# Patient Record
Sex: FEMALE | Race: WHITE | NOT HISPANIC OR LATINO
[De-identification: names, ages, dates, MRNs, and addresses within clinical notes are randomized per-mention and may not be internally consistent; named-entity substitution may affect disease eponyms.]

---
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
## 2022-06-27 ENCOUNTER — TELEPHONE ENCOUNTER (OUTPATIENT)
Dept: URBAN - METROPOLITAN AREA CLINIC 9 | Facility: CLINIC | Age: 22
End: 2022-06-27

## 2022-07-14 ENCOUNTER — OFFICE VISIT (OUTPATIENT)
Dept: URBAN - METROPOLITAN AREA CLINIC 9 | Facility: CLINIC | Age: 22
End: 2022-07-14

## 2022-07-30 ENCOUNTER — TELEPHONE ENCOUNTER (OUTPATIENT)
Age: 22
End: 2022-07-30

## 2022-07-30 RX ORDER — SODIUM SULFATE, POTASSIUM SULFATE, MAGNESIUM SULFATE 17.5; 3.13; 1.6 G/ML; G/ML; G/ML
1 (ONE) SOLUTION, CONCENTRATE ORAL
Qty: 0 | Refills: 2 | OUTPATIENT
Start: 2022-07-14 | End: 2022-07-15

## 2022-07-31 ENCOUNTER — TELEPHONE ENCOUNTER (OUTPATIENT)
Age: 22
End: 2022-07-31

## 2022-07-31 RX ORDER — PREDNISONE 10 MG/1
4 TABLET ORAL DAILY
Qty: 0 | Refills: 3 | Status: ACTIVE | COMMUNITY
Start: 2022-07-14

## 2022-07-31 RX ORDER — SODIUM SULFATE, POTASSIUM SULFATE, MAGNESIUM SULFATE 17.5; 3.13; 1.6 G/ML; G/ML; G/ML
1 (ONE) SOLUTION, CONCENTRATE ORAL
Qty: 0 | Refills: 2 | Status: ACTIVE | COMMUNITY
Start: 2022-07-14

## 2022-08-08 ENCOUNTER — OFFICE VISIT (OUTPATIENT)
Dept: URBAN - METROPOLITAN AREA SURGERY CENTER 9 | Facility: SURGERY CENTER | Age: 22
End: 2022-08-08
Payer: COMMERCIAL

## 2022-08-08 DIAGNOSIS — K56.699 COLON STRICTURE: ICD-10-CM

## 2022-08-08 DIAGNOSIS — K63.89 BACTERIAL OVERGROWTH SYNDROME: ICD-10-CM

## 2022-08-08 PROCEDURE — 45380 COLONOSCOPY AND BIOPSY: CPT | Performed by: INTERNAL MEDICINE

## 2022-08-08 RX ORDER — SODIUM SULFATE, POTASSIUM SULFATE, MAGNESIUM SULFATE 17.5; 3.13; 1.6 G/ML; G/ML; G/ML
1 (ONE) SOLUTION, CONCENTRATE ORAL
Qty: 0 | Refills: 2 | Status: ACTIVE | COMMUNITY
Start: 2022-07-14

## 2022-08-08 RX ORDER — PREDNISONE 10 MG/1
4 TABLET ORAL DAILY
Qty: 0 | Refills: 3 | Status: ACTIVE | COMMUNITY
Start: 2022-07-14

## 2022-08-23 ENCOUNTER — OFFICE VISIT (OUTPATIENT)
Dept: URBAN - METROPOLITAN AREA CLINIC 9 | Facility: CLINIC | Age: 22
End: 2022-08-23

## 2022-08-23 ENCOUNTER — TELEPHONE ENCOUNTER (OUTPATIENT)
Dept: URBAN - METROPOLITAN AREA CLINIC 9 | Facility: CLINIC | Age: 22
End: 2022-08-23

## 2022-08-23 RX ORDER — PREDNISONE 10 MG/1
4 TABLET ORAL DAILY
Qty: 0 | Refills: 3 | Status: ACTIVE | COMMUNITY
Start: 2022-07-14

## 2022-08-23 RX ORDER — SODIUM SULFATE, POTASSIUM SULFATE, MAGNESIUM SULFATE 17.5; 3.13; 1.6 G/ML; G/ML; G/ML
1 (ONE) SOLUTION, CONCENTRATE ORAL
Qty: 0 | Refills: 2 | Status: ACTIVE | COMMUNITY
Start: 2022-07-14

## 2022-08-23 NOTE — HPI-TODAY'S VISIT:
21-year-old female here for follow-up.  We last saw her about a month ago. At prior visit: 21-year-old female here for hospital follow-up.  Patient has a history of Crohn's disease.  She said she was diagnosed in 2012 when her main symptom was the perirectal fistula constipation and abdominal pain.  It sounds like she had a fistula repair and then but was put on Remicade.  She had 4-5 doses and had some kind of reaction so was switch to Humira.  This initially worked but then she got antibodies and then stopped working so she was on Entyvio.  This worked for a little bit and then stopped working so she was put on Stelara about 2-3 years ago.  At some point she was switch to every 6 weeks I do not know when.  She said in May she started having reoccurrence of right lower quadrant pain.  It was pretty bad so she went to the ER recent labs show white blood cell count of 10 and a hemoglobin of 14.3.  Should a CT scan that showed acute uncomplicated appendicitis.  She had wall thickening of the cecum and the ascending colon which was likely reactive.  She also had a dilated appendix.  ESR was 72.  She was at home on prednisone 40 mg and Bentyl.  She said the pain is gotten better now that she is on the prednisone.  She is about 2-3 bowel movements a day.  There may be slightly looser than normal.  We have a fecal Hang protectant on June 30 is which was 985.  This was pre-prednisone.  Also Stelara lab was 4.5 at the trough. At last visit we obtain records.: We have records which show on November 14 fecal Hang protectant was 4 8 we also have an MR enterography on 2014 which showed irregular wall thickening and enhancement in the terminal ileum in IC valve.  This is similar to the previous visit.  And loss of normal mucosal folds in the distal ileum.  There is also wall thickening in the proximal jejunum.  In 2017 I have another MRA which showed internal worsening mucosal enhancement of the terminal ileum and cecum in 2018 again worsening inflammatory changes in the ascending colon and the cecum and the terminal ileum  She has a history of ileocolonic Crohn's.  She was diagnosed at age 12.  Poorly had only small bowel involvement at that time.  She was immediately started on Remicade had a reaction after the third 1, not sure what this was so switch to Humira.  Remained on Humira for quite some time apparently she had some psoriasis but this was treated topically but then developed antibodies good she was not taking it consistently because it was painful due to lack of citrate because she is antibodies to switch to Entyvio which she been on for 2 years initially was every 8 weeks but she had persistent symptoms so switch to every 4 weeks knows every 6 weeks.  She was having intermittent right lower quadrant pain on this.  Her last endoscopy of 2018 showed colitis in the cecum with pseudopolyps and distortion of the cecal anatomy and inability to identify the ileocecal valve were in a paper the ileum.  The ascending pathology showed chronic colitis the remaining biopsies in the left side of colon and rectum were normal.  In generative 2020 she had another colonoscopy which again revealed inflammation the cecum inflamed ileum that was difficult to intubate so she was started on Entyvio March 26 of 2020.  Ileum showed active chronic ileitis with ulceration she also the cecal abnormality with polypoid large intestinal mucosa but that just came back as reactive with chronic inflammation After being on Stelara for about 4 weeks she went to the ER and she had a bowel obstruction she was started on tacrolimus and continued Stelara  Her small bowel obstruction on Stelara given she only been on 4 weeks she was put on high-dose steroids and was side to reinduce her was Stelara and tacrolimus was used as bridging therapy.  She was then put on Stelara every 4 weeks I think just due to the whole clinical picture  Her clinical picture was constipated in generative 2021 when she developed severe papilledema in vision abnormalities.  And headaches varied she was treated with Diamox  We proceeded with colonoscopy which was normal other than she had a cecal abnormality which was deformed and unable to enter into the ileum.  She had a cecal lesion which has been there for years that just came back as inflammation.  Colonic biopsies were normal but cecal biopsy showed inflammation.  CT 3 showed improved inflammation in the terminal ileum although there was still some.  We did ask her to check Remicade level antibodies to see if she had that as she is interested in going back to that

## 2022-08-25 ENCOUNTER — TELEPHONE ENCOUNTER (OUTPATIENT)
Dept: URBAN - METROPOLITAN AREA CLINIC 9 | Facility: CLINIC | Age: 22
End: 2022-08-25

## 2022-08-26 ENCOUNTER — TELEPHONE ENCOUNTER (OUTPATIENT)
Dept: URBAN - METROPOLITAN AREA CLINIC 9 | Facility: CLINIC | Age: 22
End: 2022-08-26

## 2022-08-29 ENCOUNTER — TELEPHONE ENCOUNTER (OUTPATIENT)
Dept: URBAN - METROPOLITAN AREA CLINIC 9 | Facility: CLINIC | Age: 22
End: 2022-08-29

## 2022-08-29 RX ORDER — USTEKINUMAB 90 MG/ML
AS DIRECTED INJECTION, SOLUTION SUBCUTANEOUS
Qty: 4 | Refills: 4

## 2022-08-29 RX ORDER — USTEKINUMAB 90 MG/ML
AS DIRECTED INJECTION, SOLUTION SUBCUTANEOUS
Qty: 4 | Refills: 4
End: 2023-06-05

## 2022-08-30 ENCOUNTER — TELEPHONE ENCOUNTER (OUTPATIENT)
Dept: URBAN - METROPOLITAN AREA CLINIC 9 | Facility: CLINIC | Age: 22
End: 2022-08-30

## 2022-09-08 ENCOUNTER — OFFICE VISIT (OUTPATIENT)
Dept: URBAN - METROPOLITAN AREA CLINIC 9 | Facility: CLINIC | Age: 22
End: 2022-09-08

## 2022-09-08 NOTE — HPI-TODAY'S VISIT:
21-year-old female here for follow-up.  We last saw her about a month ago. At prior visit: 21-year-old female here for hospital follow-up.  Patient has a history of Crohn's disease.  She said she was diagnosed in 2012 when her main symptom was the perirectal fistula constipation and abdominal pain.  It sounds like she had a fistula repair and then but was put on Remicade.  She had 4-5 doses and had some kind of reaction so was switch to Humira.  This initially worked but then she got antibodies and then stopped working so she was on Entyvio.  This worked for a little bit and then stopped working so she was put on Stelara about 2-3 years ago.  At some point she was switch to every 6 weeks I do not know when.  She said in May she started having reoccurrence of right lower quadrant pain.  It was pretty bad so she went to the ER recent labs show white blood cell count of 10 and a hemoglobin of 14.3.  Should a CT scan that showed acute uncomplicated appendicitis.  She had wall thickening of the cecum and the ascending colon which was likely reactive.  She also had a dilated appendix.  ESR was 72.  She was at home on prednisone 40 mg and Bentyl.  She said the pain is gotten better now that she is on the prednisone.  She is about 2-3 bowel movements a day.  There may be slightly looser than normal.  We have a fecal Hang protectant on June 30 is which was 985.  This was pre-prednisone.  Also Stelara lab was 4.5 at the trough. At last visit we obtain records.: We have records which show on November 14 fecal Hang protectant was 4 8 we also have an MR enterography on 2014 which showed irregular wall thickening and enhancement in the terminal ileum in IC valve.  This is similar to the previous visit.  And loss of normal mucosal folds in the distal ileum.  There is also wall thickening in the proximal jejunum.  In 2017 I have another MRA which showed internal worsening mucosal enhancement of the terminal ileum and cecum in 2018 again worsening inflammatory changes in the ascending colon and the cecum and the terminal ileum  She has a history of ileocolonic Crohn's.  She was diagnosed at age 12.  Poorly had only small bowel involvement at that time.  She was immediately started on Remicade had a reaction after the third 1, not sure what this was so switch to Humira.  Remained on Humira for quite some time apparently she had some psoriasis but this was treated topically but then developed antibodies good she was not taking it consistently because it was painful due to lack of citrate because she is antibodies to switch to Entyvio which she been on for 2 years initially was every 8 weeks but she had persistent symptoms so switch to every 4 weeks knows every 6 weeks.  She was having intermittent right lower quadrant pain on this.  Her last endoscopy of 2018 showed colitis in the cecum with pseudopolyps and distortion of the cecal anatomy and inability to identify the ileocecal valve were in a paper the ileum.  The ascending pathology showed chronic colitis the remaining biopsies in the left side of colon and rectum were normal.  In generative 2020 she had another colonoscopy which again revealed inflammation the cecum inflamed ileum that was difficult to intubate so she was started on Entyvio March 26 of 2020.  Ileum showed active chronic ileitis with ulceration she also the cecal abnormality with polypoid large intestinal mucosa but that just came back as reactive with chronic inflammation After being on Stelara for about 4 weeks she went to the ER and she had a bowel obstruction she was started on tacrolimus and continued Stelara  Her small bowel obstruction on Stelara given she only been on 4 weeks she was put on high-dose steroids and was side to reinduce her was Stelara and tacrolimus was used as bridging therapy.  She was then put on Stelara every 4 weeks I think just due to the whole clinical picture  Her clinical picture was constipated in generative 2021 when she developed severe papilledema in vision abnormalities.  And headaches varied she was treated with Diamox  We proceeded with colonoscopy which was normal other than she had a cecal abnormality which was deformed and unable to enter into the ileum.  She had a cecal lesion which has been there for years that just came back as inflammation.  Colonic biopsies were normal but cecal biopsy showed inflammation.  CT 3 showed improved inflammation in the terminal ileum although there was still some.  We did ask her to check Remicade level antibodies to see if she had that as she is interested in going back to that At last visit, given active ileal dz despite being on q 6 weeks of stelara for over one year, we decided we were going to switch her to either remicaide or skyrizi, we were awaiting to see if she has remicaide antibodies

## 2022-09-12 ENCOUNTER — TELEPHONE ENCOUNTER (OUTPATIENT)
Dept: URBAN - METROPOLITAN AREA CLINIC 9 | Facility: CLINIC | Age: 22
End: 2022-09-12

## 2022-09-20 ENCOUNTER — WEB ENCOUNTER (OUTPATIENT)
Dept: URBAN - METROPOLITAN AREA CLINIC 9 | Facility: CLINIC | Age: 22
End: 2022-09-20

## 2022-09-20 ENCOUNTER — LAB OUTSIDE AN ENCOUNTER (OUTPATIENT)
Dept: URBAN - METROPOLITAN AREA CLINIC 9 | Facility: CLINIC | Age: 22
End: 2022-09-20

## 2022-09-20 ENCOUNTER — OFFICE VISIT (OUTPATIENT)
Dept: URBAN - METROPOLITAN AREA CLINIC 9 | Facility: CLINIC | Age: 22
End: 2022-09-20
Payer: COMMERCIAL

## 2022-09-20 ENCOUNTER — TELEPHONE ENCOUNTER (OUTPATIENT)
Dept: URBAN - METROPOLITAN AREA CLINIC 7 | Facility: CLINIC | Age: 22
End: 2022-09-20

## 2022-09-20 VITALS
HEIGHT: 63 IN | SYSTOLIC BLOOD PRESSURE: 127 MMHG | WEIGHT: 194 LBS | DIASTOLIC BLOOD PRESSURE: 88 MMHG | BODY MASS INDEX: 34.38 KG/M2

## 2022-09-20 DIAGNOSIS — K50.90 CROHN DISEASE: ICD-10-CM

## 2022-09-20 PROCEDURE — 99214 OFFICE O/P EST MOD 30 MIN: CPT | Performed by: INTERNAL MEDICINE

## 2022-09-20 RX ORDER — USTEKINUMAB 90 MG/ML
AS DIRECTED INJECTION, SOLUTION SUBCUTANEOUS
Qty: 4 | Refills: 4 | Status: ACTIVE | COMMUNITY
End: 2023-06-05

## 2022-09-20 RX ORDER — PREDNISONE 10 MG/1
4 TABLET ORAL DAILY
Qty: 0 | Refills: 3 | Status: DISCONTINUED | COMMUNITY
Start: 2022-07-14

## 2022-09-20 RX ORDER — SODIUM SULFATE, POTASSIUM SULFATE, MAGNESIUM SULFATE 17.5; 3.13; 1.6 G/ML; G/ML; G/ML
1 (ONE) SOLUTION, CONCENTRATE ORAL
Qty: 0 | Refills: 2 | Status: DISCONTINUED | COMMUNITY
Start: 2022-07-14

## 2022-09-20 RX ORDER — RISANKIZUMAB-RZAA 60 MG/ML
AS DIRECTED INJECTION INTRAVENOUS AS DIRECTED
Qty: 3 | Refills: 0 | OUTPATIENT
Start: 2022-09-20 | End: 2022-11-18

## 2022-09-20 NOTE — HPI-TODAY'S VISIT:
21-year-old female here for follow-up.  We last saw her about a month ago. At prior visit: 21-year-old female here for hospital follow-up.  Patient has a history of Crohn's disease.  She said she was diagnosed in 2012 when her main symptom was the perirectal fistula constipation and abdominal pain.  It sounds like she had a fistula repair and then but was put on Remicade.  She had 4-5 doses and had some kind of reaction so was switch to Humira.  This initially worked but then she got antibodies and then stopped working so she was on Entyvio.  This worked for a little bit and then stopped working so she was put on Stelara about 2-3 years ago.  At some point she was switched to every 6 weeks I do not know when.  She said in May she started having reoccurrence of right lower quadrant pain.  It was pretty bad so she went to the ER recent labs show white blood cell count of 10 and a hemoglobin of 14.3.  Should a CT scan that showed acute uncomplicated appendicitis.  She had wall thickening of the cecum and the ascending colon which was likely reactive.  She also had a dilated appendix.  ESR was 72.  She was at home on prednisone 40 mg and Bentyl.  She said the pain is gotten better now that she is on the prednisone.  She is about 2-3 bowel movements a day.  There may be slightly looser than normal.  We have a fecal Hang protectant on June 30 is which was 985.  This was pre-prednisone.  Also Stelara lab was 4.5 at the trough. At last visit we obtain records.: We have records which show on November 14 fecal Hang protectant was  8 we also have an MR enterography on 2014 which showed irregular wall thickening and enhancement in the terminal ileum in IC valve.  This is similar to the previous visit.  And loss of normal mucosal folds in the distal ileum.  There is also wall thickening in the proximal jejunum.  In 2017 I have another MRE which showed internal worsening mucosal enhancement of the terminal ileum and cecum in 2018 again worsening inflammatory changes in the ascending colon and the cecum and the terminal ileum  She has a history of ileocolonic Crohn's.  She was diagnosed at age 12.  Initially  had only small bowel involvement at that time.  She was immediately started on Remicade had a reaction after the third 1, and switched to Humira.  Remained on Humira for quite some time apparently she had some psoriasis but this was treated topically but then developed antibodies so  she was  switched to Entyvio which she been on for 2 years initially was every 8 weeks but she had persistent symptoms so switch to every 4 weeks knows every 6 weeks.  She was having intermittent right lower quadrant pain on this.  Her last endoscopy of 2018 showed colitis in the cecum with pseudopolyps and distortion of the cecal anatomy and inability to identify the ileocecal valve were in a paper the ileum.  The ascending pathology showed chronic colitis the remaining biopsies in the left side of colon and rectum were normal.  In  2020 she had another colonoscopy which again revealed inflammation the cecum inflamed ileum that was difficult to intubate so she was started on Entyvio March 26 of 2020.  Ileum showed active chronic ileitis with ulceration she also the cecal abnormality with polypoid large intestinal mucosa but that just came back as reactive with chronic inflammation After being on Stelara for about 4 weeks she went to the ER and she had a bowel obstruction she was started on tacrolimus and continued Stelara  Given her small bowel obstruction on Stelara given she only been on 4 weeks she was put on high-dose steroids and was decided to reinduce her was Stelara and tacrolimus was used as bridging therapy.  She was then put on Stelara every 6 weeks I think just due to the whole clinical picture  In 2021, developed severe papilledema and  vision abnormalities.  And headaches varied she was treated with Diamox  We proceeded with colonoscopy this year 7/2022 which was normal other than she had a cecal abnormality which was deformed and unable to enter into the ileum.  She had a cecal lesion which has been there for years that just came back as inflammation.  Colonic biopsies were normal but cecal biopsy showed inflammation.  CT 3 showed improved inflammation in the terminal ileum although there was still some inflammation.   We did ask her to check Remicade level antibodies to see if she had that as she is interested in going back to that At last visit, given active ileal dz despite being on q 6 weeks of stelara for over one year, we decided we were going to switch her to either remicaide or skyrizi, we were awaiting to see if she has remicaide antibodies.  There was no antibodies.    She is here for follow-up today.  She had been put on a trial of steroids in July.  This was before she had that CT scan.  She is having really intermittent right lower quadrant pain not related to eating or bowel movement.  She has about 2 bowel movements a day that are loose that can fluctuate with constipation.  When her symptoms were very bad she had more constipation.  Weight is stable.  She says when she tried Remicade she had chest pain.

## 2022-10-29 LAB
HEP B CORE AB, TOT: (no result)
HEPATITIS B SURFACE ANTIBODY QL: (no result)
MITOGEN-NIL: >10
NIL: 0.04
QUANTIFERON(R)-TB GOLD: NEGATIVE
TB1-NIL: 0
TB2-NIL: 0

## 2022-11-03 ENCOUNTER — TELEPHONE ENCOUNTER (OUTPATIENT)
Dept: URBAN - METROPOLITAN AREA CLINIC 7 | Facility: CLINIC | Age: 22
End: 2022-11-03

## 2022-12-09 LAB — HEPATITIS B SURFACE ANTIGEN: (no result)

## 2022-12-10 ENCOUNTER — TELEPHONE ENCOUNTER (OUTPATIENT)
Dept: URBAN - METROPOLITAN AREA CLINIC 9 | Facility: CLINIC | Age: 22
End: 2022-12-10

## 2022-12-10 RX ORDER — USTEKINUMAB 90 MG/ML
AS DIRECTED INJECTION, SOLUTION SUBCUTANEOUS
Qty: 4 | Refills: 4 | COMMUNITY
End: 2023-06-05

## 2022-12-10 RX ORDER — RISANKIZUMAB-RZAA 60 MG/ML
AS DIRECTED INJECTION INTRAVENOUS
Qty: 1 APPLICATOR | Refills: 0 | OUTPATIENT

## 2022-12-10 RX ORDER — RISANKIZUMAB-RZAA 360 MG/2.4
AS DIRECTED KIT SUBCUTANEOUS
Qty: 4 APPLICATOR | Refills: 0 | OUTPATIENT
Start: 2022-12-10 | End: 2022-12-18

## 2023-01-19 ENCOUNTER — TELEPHONE ENCOUNTER (OUTPATIENT)
Dept: URBAN - METROPOLITAN AREA CLINIC 7 | Facility: CLINIC | Age: 23
End: 2023-01-19

## 2023-01-19 RX ORDER — RISANKIZUMAB-RZAA 60 MG/ML
AS DIRECTED INJECTION INTRAVENOUS
Qty: 1 APPLICATOR | Refills: 0 | COMMUNITY

## 2023-01-19 RX ORDER — USTEKINUMAB 90 MG/ML
AS DIRECTED INJECTION, SOLUTION SUBCUTANEOUS
Qty: 4 | Refills: 4 | COMMUNITY
End: 2023-06-05

## 2023-01-19 RX ORDER — RISANKIZUMAB-RZAA 360 MG/2.4
AS DIRECTED WEARABLE INJECTOR SUBCUTANEOUS
Qty: 4 | Refills: 0 | OUTPATIENT
Start: 2023-01-19 | End: 2023-01-27

## 2023-01-30 ENCOUNTER — LAB OUTSIDE AN ENCOUNTER (OUTPATIENT)
Dept: URBAN - METROPOLITAN AREA CLINIC 9 | Facility: CLINIC | Age: 23
End: 2023-01-30

## 2023-01-30 ENCOUNTER — TELEPHONE ENCOUNTER (OUTPATIENT)
Dept: URBAN - METROPOLITAN AREA CLINIC 9 | Facility: CLINIC | Age: 23
End: 2023-01-30

## 2023-02-28 ENCOUNTER — OFFICE VISIT (OUTPATIENT)
Dept: URBAN - METROPOLITAN AREA CLINIC 9 | Facility: CLINIC | Age: 23
End: 2023-02-28
Payer: COMMERCIAL

## 2023-02-28 ENCOUNTER — LAB OUTSIDE AN ENCOUNTER (OUTPATIENT)
Dept: URBAN - METROPOLITAN AREA CLINIC 9 | Facility: CLINIC | Age: 23
End: 2023-02-28

## 2023-02-28 ENCOUNTER — WEB ENCOUNTER (OUTPATIENT)
Dept: URBAN - METROPOLITAN AREA CLINIC 9 | Facility: CLINIC | Age: 23
End: 2023-02-28

## 2023-02-28 VITALS
DIASTOLIC BLOOD PRESSURE: 83 MMHG | HEIGHT: 63 IN | WEIGHT: 180 LBS | BODY MASS INDEX: 31.89 KG/M2 | SYSTOLIC BLOOD PRESSURE: 124 MMHG

## 2023-02-28 DIAGNOSIS — K82.4 GALLBLADDER POLYP: ICD-10-CM

## 2023-02-28 DIAGNOSIS — K21.9 REFLUX: ICD-10-CM

## 2023-02-28 DIAGNOSIS — K50.90 CROHN DISEASE: ICD-10-CM

## 2023-02-28 PROCEDURE — 99204 OFFICE O/P NEW MOD 45 MIN: CPT | Performed by: INTERNAL MEDICINE

## 2023-02-28 PROCEDURE — 99214 OFFICE O/P EST MOD 30 MIN: CPT | Performed by: INTERNAL MEDICINE

## 2023-02-28 RX ORDER — ONDANSETRON 8 MG/1
1 TABLET ON THE TONGUE AND ALLOW TO DISSOLVE  AS NEEDED TABLET, ORALLY DISINTEGRATING ORAL ONCE A DAY
Qty: 90 | Refills: 0 | OUTPATIENT
Start: 2023-02-28

## 2023-02-28 RX ORDER — PANTOPRAZOLE SODIUM 40 MG/1
1 TABLET TABLET, DELAYED RELEASE ORAL ONCE A DAY
Qty: 30 | OUTPATIENT
Start: 2023-02-28

## 2023-02-28 RX ORDER — USTEKINUMAB 90 MG/ML
AS DIRECTED INJECTION, SOLUTION SUBCUTANEOUS
Qty: 4 | Refills: 4 | Status: DISCONTINUED | COMMUNITY
End: 2023-06-05

## 2023-02-28 RX ORDER — RISANKIZUMAB-RZAA 60 MG/ML
AS DIRECTED INJECTION INTRAVENOUS
Qty: 1 APPLICATOR | Refills: 0 | Status: ACTIVE | COMMUNITY

## 2023-02-28 NOTE — HPI-TODAY'S VISIT:
21-year-old female here for follow-up.  We last saw her about a month ago. At prior visit: 21-year-old female here for hospital follow-up.  Patient has a history of Crohn's disease.  She said she was diagnosed in 2012 when her main symptom was the perirectal fistula constipation and abdominal pain.  It sounds like she had a fistula repair and then but was put on Remicade.  She had 4-5 doses and had some kind of reaction so was switch to Humira.  This initially worked but then she got antibodies and then stopped working so she was on Entyvio.  This worked for a little bit and then stopped working so she was put on Stelara about 2-3 years ago.  At some point she was switched to every 6 weeks I do not know when.  She said in May she started having reoccurrence of right lower quadrant pain.  It was pretty bad so she went to the ER recent labs show white blood cell count of 10 and a hemoglobin of 14.3.  Should a CT scan that showed acute uncomplicated appendicitis.  She had wall thickening of the cecum and the ascending colon which was likely reactive.  She also had a dilated appendix.  ESR was 72.  She was at home on prednisone 40 mg and Bentyl.  She said the pain is gotten better now that she is on the prednisone.  She is about 2-3 bowel movements a day.  There may be slightly looser than normal.  We have a fecal Hang protectant on June 30 is which was 985.  This was pre-prednisone.  Also Stelara lab was 4.5 at the trough. At last visit we obtain records.: We have records which show on November 14 fecal Hang protectant was  8 we also have an MR enterography on 2014 which showed irregular wall thickening and enhancement in the terminal ileum in IC valve.  This is similar to the previous visit.  And loss of normal mucosal folds in the distal ileum.  There is also wall thickening in the proximal jejunum.  In 2017 I have another MRE which showed internal worsening mucosal enhancement of the terminal ileum and cecum in 2018 again worsening inflammatory changes in the ascending colon and the cecum and the terminal ileum  She has a history of ileocolonic Crohn's.  She was diagnosed at age 12.  Initially  had only small bowel involvement at that time.  She was immediately started on Remicade had a reaction after the third 1, and switched to Humira.  Remained on Humira for quite some time apparently she had some psoriasis but this was treated topically but then developed antibodies so  she was  switched to Entyvio which she been on for 2 years initially was every 8 weeks but she had persistent symptoms so switch to every 4 weeks knows every 6 weeks.  She was having intermittent right lower quadrant pain on this.  Her last endoscopy of 2018 showed colitis in the cecum with pseudopolyps and distortion of the cecal anatomy and inability to identify the ileocecal valve were in a paper the ileum.  The ascending pathology showed chronic colitis the remaining biopsies in the left side of colon and rectum were normal.  In  2020 she had another colonoscopy which again revealed inflammation the cecum inflamed ileum that was difficult to intubate so she was started on Entyvio March 26 of 2020.  Ileum showed active chronic ileitis with ulceration she also the cecal abnormality with polypoid large intestinal mucosa but that just came back as reactive with chronic inflammation After being on Stelara for about 4 weeks she went to the ER and she had a bowel obstruction she was started on tacrolimus and continued Stelara  Given her small bowel obstruction on Stelara given she only been on 4 weeks she was put on high-dose steroids and was decided to reinduce her was Stelara and tacrolimus was used as bridging therapy.  She was then put on Stelara every 6 weeks I think just due to the whole clinical picture  In 2021, developed severe papilledema and  vision abnormalities.  And headaches varied she was treated with Diamox  We proceeded with colonoscopy this year 7/2022 which was normal other than she had a cecal abnormality which was deformed and unable to enter into the ileum.  She had a cecal lesion which has been there for years that just came back as inflammation.  Colonic biopsies were normal but cecal biopsy showed inflammation.  CT 3 showed improved inflammation in the terminal ileum although there was still some inflammation.   We did ask her to check Remicade level antibodies to see if she had that as she is interested in going back to that At last visit, given active ileal dz despite being on q 6 weeks of stelara for over one year, we decided we were going to switch her to either remicaide or skyrizi, we were awaiting to see if she has remicaide antibodies.  There was no antibodies.    She is here for follow-up today.  She had been put on a trial of steroids in July.  This was before she had that CT scan.  She is having really intermittent right lower quadrant pain not related to eating or bowel movement.  She has about 2 bowel movements a day that are loose that can fluctuate with constipation.  When her symptoms were very bad she had more constipation.  Weight is stable.  She says when she tried Remicade she had chest pain.  At last visit we received a do a CT enterography and fecal calprotectin and started paperwork for Skyrizi Do not see fecal calprotectin in.  CT [place] fee in July showed decrease inflammation in the ileum but there is still mild inflammation and we switched her to Skyrizi which did take a long time due to authorization issues  labs in August were normal, she is not immune to hepatitis B  She just started her Skyrizi and had 2 doses her last one was February 8.  She has not really noticed a change.  Then couple days ago she had acute onset of right lower quadrant pain which is normally where she gets her pain but this was more significant.  She went to the ER and they did basic labs which showed normal hemoglobin but an elevated white count of 16.5 normal LFTs.  For some reason they did an ultrasound instead of a CT scan which showed a 2 mm gallbladder polyp.  They did not image her.  She is also having nausea and fever and.  And also having some numbness in her hands.  This is really resolved and she is back to having just intermittent right lower quadrant abdominal pain.  Maybe a little bit more frequently than other times.  They also sent her home with dicyclomine.  She is having 1 bowel movement a day which is somewhat loose.  No hematochezia she has lost a few pounds and she is having a lot of reflux which is a newer symptom.  She still having a little bit of nausea and has not really tried to advance her diet since being out of the ER

## 2023-03-01 ENCOUNTER — TELEPHONE ENCOUNTER (OUTPATIENT)
Dept: URBAN - METROPOLITAN AREA CLINIC 9 | Facility: CLINIC | Age: 23
End: 2023-03-01

## 2023-03-01 PROBLEM — 235595009 GASTROESOPHAGEAL REFLUX DISEASE: Status: ACTIVE | Noted: 2023-02-28

## 2023-03-01 PROBLEM — 34000006 CROHN DISEASE: Status: ACTIVE | Noted: 2022-08-05

## 2023-03-07 ENCOUNTER — OFFICE VISIT (OUTPATIENT)
Dept: URBAN - METROPOLITAN AREA CLINIC 9 | Facility: CLINIC | Age: 23
End: 2023-03-07
Payer: COMMERCIAL

## 2023-03-07 VITALS
DIASTOLIC BLOOD PRESSURE: 78 MMHG | WEIGHT: 173 LBS | BODY MASS INDEX: 30.65 KG/M2 | SYSTOLIC BLOOD PRESSURE: 118 MMHG | HEIGHT: 63 IN

## 2023-03-07 DIAGNOSIS — K50.90 CROHN'S DISEASE WITHOUT COMPLICATION, UNSPECIFIED GASTROINTESTINAL TRACT LOCATION: ICD-10-CM

## 2023-03-07 DIAGNOSIS — K82.4 GALLBLADDER POLYP: ICD-10-CM

## 2023-03-07 DIAGNOSIS — K21.9 GERD: ICD-10-CM

## 2023-03-07 PROCEDURE — 99214 OFFICE O/P EST MOD 30 MIN: CPT | Performed by: INTERNAL MEDICINE

## 2023-03-07 RX ORDER — ONDANSETRON 8 MG/1
1 TABLET ON THE TONGUE AND ALLOW TO DISSOLVE  AS NEEDED TABLET, ORALLY DISINTEGRATING ORAL ONCE A DAY
Qty: 90 | Refills: 0 | Status: ACTIVE | COMMUNITY
Start: 2023-02-28

## 2023-03-07 RX ORDER — PANTOPRAZOLE SODIUM 40 MG/1
1 TABLET TABLET, DELAYED RELEASE ORAL ONCE A DAY
Qty: 30 | Status: ACTIVE | COMMUNITY
Start: 2023-02-28

## 2023-03-07 RX ORDER — RISANKIZUMAB-RZAA 60 MG/ML
AS DIRECTED INJECTION INTRAVENOUS
Qty: 1 APPLICATOR | Refills: 0 | Status: ACTIVE | COMMUNITY

## 2023-03-07 NOTE — PHYSICAL EXAM NECK/THYROID:
normal appearance , without tenderness upon palpation , no deformities , trachea midline , Thyroid normal size , no masses , thyroid nontender 14-Jun-2017 14:05

## 2023-03-07 NOTE — HPI-TODAY'S VISIT:
21-year-old female here for follow-up.  We last saw her about a month ago. At prior visit: 21-year-old female here for hospital follow-up.  Patient has a history of Crohn's disease.  She said she was diagnosed in 2012 when her main symptom was the perirectal fistula constipation and abdominal pain.  It sounds like she had a fistula repair and then but was put on Remicade.  She had 4-5 doses and had some kind of reaction so was switch to Humira.  This initially worked but then she got antibodies and then stopped working so she was on Entyvio.  This worked for a little bit and then stopped working so she was put on Stelara about 2-3 years ago.  At some point she was switched to every 6 weeks I do not know when.  She said in May she started having reoccurrence of right lower quadrant pain.  It was pretty bad so she went to the ER recent labs show white blood cell count of 10 and a hemoglobin of 14.3.  Should a CT scan that showed acute uncomplicated appendicitis.  She had wall thickening of the cecum and the ascending colon which was likely reactive.  She also had a dilated appendix.  ESR was 72.  She was at home on prednisone 40 mg and Bentyl.  She said the pain is gotten better now that she is on the prednisone.  She is about 2-3 bowel movements a day.  There may be slightly looser than normal.  We have a fecal Hang protectant on June 30 is which was 985.  This was pre-prednisone.  Also Stelara lab was 4.5 at the trough. At last visit we obtain records.: We have records which show on November 14 fecal Hang protectant was  8 we also have an MR enterography on 2014 which showed irregular wall thickening and enhancement in the terminal ileum in IC valve.  This is similar to the previous visit.  And loss of normal mucosal folds in the distal ileum.  There is also wall thickening in the proximal jejunum.  In 2017 I have another MRE which showed internal worsening mucosal enhancement of the terminal ileum and cecum in 2018 again worsening inflammatory changes in the ascending colon and the cecum and the terminal ileum  She has a history of ileocolonic Crohn's.  She was diagnosed at age 12.  Initially  had only small bowel involvement at that time.  She was immediately started on Remicade had a reaction after the third 1, and switched to Humira.  Remained on Humira for quite some time apparently she had some psoriasis but this was treated topically but then developed antibodies so  she was  switched to Entyvio which she been on for 2 years initially was every 8 weeks but she had persistent symptoms so switch to every 4 weeks knows every 6 weeks.  She was having intermittent right lower quadrant pain on this.  Her last endoscopy of 2018 showed colitis in the cecum with pseudopolyps and distortion of the cecal anatomy and inability to identify the ileocecal valve were in a paper the ileum.  The ascending pathology showed chronic colitis the remaining biopsies in the left side of colon and rectum were normal.  In  2020 she had another colonoscopy which again revealed inflammation the cecum inflamed ileum that was difficult to intubate so she was started on Entyvio March 26 of 2020.  Ileum showed active chronic ileitis with ulceration she also the cecal abnormality with polypoid large intestinal mucosa but that just came back as reactive with chronic inflammation After being on Stelara for about 4 weeks she went to the ER and she had a bowel obstruction she was started on tacrolimus and continued Stelara  Given her small bowel obstruction on Stelara given she only been on 4 weeks she was put on high-dose steroids and was decided to reinduce her was Stelara and tacrolimus was used as bridging therapy.  She was then put on Stelara every 6 weeks I think just due to the whole clinical picture  In 2021, developed severe papilledema and  vision abnormalities.  And headaches varied she was treated with Diamox  We proceeded with colonoscopy this year 7/2022 which was normal other than she had a cecal abnormality which was deformed and unable to enter into the ileum.  She had a cecal lesion which has been there for years that just came back as inflammation.  Colonic biopsies were normal but cecal biopsy showed inflammation.  CT 3 showed improved inflammation in the terminal ileum although there was still some inflammation.   We did ask her to check Remicade level antibodies to see if she had that as she is interested in going back to that At last visit, given active ileal dz despite being on q 6 weeks of stelara for over one year, we decided we were going to switch her to either remicaide or skyrizi, we were awaiting to see if she has remicaide antibodies.  There was no antibodies.    She is here for follow-up today.  She had been put on a trial of steroids in July.  This was before she had that CT scan.  She is having really intermittent right lower quadrant pain not related to eating or bowel movement.  She has about 2 bowel movements a day that are loose that can fluctuate with constipation.  When her symptoms were very bad she had more constipation.  Weight is stable.  She says when she tried Remicade she had chest pain.  At last visit we received a do a CT enterography and fecal calprotectin and started paperwork for Skyrizi Do not see fecal calprotectin in.  CT [place] fee in July showed decrease inflammation in the ileum but there is still mild inflammation and we switched her to Skyrizi which did take a long time due to authorization issues  labs in August were normal, she is not immune to hepatitis B  She just started her Skyrizi and had 2 doses her last one was February 8.  She has not really noticed a change.  Then couple days ago she had acute onset of right lower quadrant pain which is normally where she gets her pain but this was more significant.  She went to the ER and they did basic labs which showed normal hemoglobin but an elevated white count of 16.5 normal LFTs.  For some reason they did an ultrasound instead of a CT scan which showed a 2 mm gallbladder polyp.  They did not image her.  She is also having nausea and fever and.  And also having some numbness in her hands.  This is really resolved and she is back to having just intermittent right lower quadrant abdominal pain.  Maybe a little bit more frequently than other times.  They also sent her home with dicyclomine.  She is having 1 bowel movement a day which is somewhat loose.  No hematochezia she has lost a few pounds and she is having a lot of reflux which is a newer symptom.  She still having a little bit of nausea and has not really tried to advance her diet since being out of the ER At last visit, we ordered a CTE to see if her symptoms were due to her Crohns or a viral infection.  She comes in for follow-up today.  She is doing about the same.  Still getting intermittent and right lower quadrant pain now she cannot feels a hardness in the midepigastrium.  She has been on Protonix a week for her reflux she was getting no real change there.  We got a CT enterography which showed improved inflammation in the ileum and she gets her third Skyrizi tomorrow

## 2023-03-08 PROBLEM — 34000006: Status: ACTIVE | Noted: 2023-03-08

## 2023-03-08 PROBLEM — 235595009 GASTROESOPHAGEAL REFLUX DISEASE: Status: ACTIVE | Noted: 2023-03-07

## 2023-04-04 ENCOUNTER — TELEPHONE ENCOUNTER (OUTPATIENT)
Dept: URBAN - METROPOLITAN AREA CLINIC 9 | Facility: CLINIC | Age: 23
End: 2023-04-04

## 2023-04-04 RX ORDER — RISANKIZUMAB-RZAA 360 MG/2.4
AS DIRECTED WEARABLE INJECTOR SUBCUTANEOUS
Qty: 1 | Refills: 4 | OUTPATIENT
Start: 2023-04-06 | End: 2024-01-10

## 2023-04-28 ENCOUNTER — WEB ENCOUNTER (OUTPATIENT)
Dept: URBAN - METROPOLITAN AREA CLINIC 9 | Facility: CLINIC | Age: 23
End: 2023-04-28

## 2023-04-28 RX ORDER — PANTOPRAZOLE SODIUM 40 MG/1
1 TABLET TABLET, DELAYED RELEASE ORAL ONCE A DAY
Qty: 30 | Refills: 2 | OUTPATIENT
Start: 2023-05-02

## 2023-06-05 ENCOUNTER — LAB OUTSIDE AN ENCOUNTER (OUTPATIENT)
Dept: URBAN - METROPOLITAN AREA CLINIC 9 | Facility: CLINIC | Age: 23
End: 2023-06-05

## 2023-06-07 ENCOUNTER — OFFICE VISIT (OUTPATIENT)
Dept: URBAN - METROPOLITAN AREA CLINIC 9 | Facility: CLINIC | Age: 23
End: 2023-06-07

## 2023-06-07 RX ORDER — PANTOPRAZOLE SODIUM 40 MG/1
1 TABLET TABLET, DELAYED RELEASE ORAL ONCE A DAY
Qty: 30 | Status: ACTIVE | COMMUNITY
Start: 2023-02-28

## 2023-06-07 RX ORDER — RISANKIZUMAB-RZAA 60 MG/ML
AS DIRECTED INJECTION INTRAVENOUS
Qty: 1 APPLICATOR | Refills: 0 | Status: ACTIVE | COMMUNITY

## 2023-06-07 RX ORDER — RISANKIZUMAB-RZAA 360 MG/2.4
AS DIRECTED WEARABLE INJECTOR SUBCUTANEOUS
Qty: 1 | Refills: 4 | Status: ACTIVE | COMMUNITY
Start: 2023-04-06 | End: 2024-01-10

## 2023-06-07 RX ORDER — ONDANSETRON 8 MG/1
1 TABLET ON THE TONGUE AND ALLOW TO DISSOLVE  AS NEEDED TABLET, ORALLY DISINTEGRATING ORAL ONCE A DAY
Qty: 90 | Refills: 0 | Status: ACTIVE | COMMUNITY
Start: 2023-02-28

## 2023-06-07 RX ORDER — PANTOPRAZOLE SODIUM 40 MG/1
1 TABLET TABLET, DELAYED RELEASE ORAL ONCE A DAY
Qty: 30 | Refills: 2 | Status: ACTIVE | COMMUNITY
Start: 2023-05-02

## 2023-06-07 NOTE — HPI-TODAY'S VISIT:
21-year-old female here for follow-up.  We last saw her about a month ago. At prior visit: 21-year-old female here for hospital follow-up.  Patient has a history of Crohn's disease.  She said she was diagnosed in 2012 when her main symptom was the perirectal fistula constipation and abdominal pain.  It sounds like she had a fistula repair and then but was put on Remicade.  She had 4-5 doses and had some kind of reaction so was switch to Humira.  This initially worked but then she got antibodies and then stopped working so she was on Entyvio.  This worked for a little bit and then stopped working so she was put on Stelara about 2-3 years ago.  At some point she was switched to every 6 weeks I do not know when.  She said in May she started having reoccurrence of right lower quadrant pain.  It was pretty bad so she went to the ER recent labs show white blood cell count of 10 and a hemoglobin of 14.3.  Should a CT scan that showed acute uncomplicated appendicitis.  She had wall thickening of the cecum and the ascending colon which was likely reactive.  She also had a dilated appendix.  ESR was 72.  She was at home on prednisone 40 mg and Bentyl.  She said the pain is gotten better now that she is on the prednisone.  She is about 2-3 bowel movements a day.  There may be slightly looser than normal.  We have a fecal Hang protectant on June 30 is which was 985.  This was pre-prednisone.  Also Stelara lab was 4.5 at the trough. At last visit we obtain records.: We have records which show on November 14 fecal Hang protectant was  8 we also have an MR enterography on 2014 which showed irregular wall thickening and enhancement in the terminal ileum in IC valve.  This is similar to the previous visit.  And loss of normal mucosal folds in the distal ileum.  There is also wall thickening in the proximal jejunum.  In 2017 I have another MRE which showed internal worsening mucosal enhancement of the terminal ileum and cecum in 2018 again worsening inflammatory changes in the ascending colon and the cecum and the terminal ileum  She has a history of ileocolonic Crohn's.  She was diagnosed at age 12.  Initially  had only small bowel involvement at that time.  She was immediately started on Remicade had a reaction after the third 1, and switched to Humira.  Remained on Humira for quite some time apparently she had some psoriasis but this was treated topically but then developed antibodies so  she was  switched to Entyvio which she been on for 2 years initially was every 8 weeks but she had persistent symptoms so switch to every 4 weeks knows every 6 weeks.  She was having intermittent right lower quadrant pain on this.  Her last endoscopy of 2018 showed colitis in the cecum with pseudopolyps and distortion of the cecal anatomy and inability to identify the ileocecal valve were in a paper the ileum.  The ascending pathology showed chronic colitis the remaining biopsies in the left side of colon and rectum were normal.  In  2020 she had another colonoscopy which again revealed inflammation the cecum inflamed ileum that was difficult to intubate so she was started on Entyvio March 26 of 2020.  Ileum showed active chronic ileitis with ulceration she also the cecal abnormality with polypoid large intestinal mucosa but that just came back as reactive with chronic inflammation After being on Stelara for about 4 weeks she went to the ER and she had a bowel obstruction she was started on tacrolimus and continued Stelara  Given her small bowel obstruction on Stelara given she only been on 4 weeks she was put on high-dose steroids and was decided to reinduce her was Stelara and tacrolimus was used as bridging therapy.  She was then put on Stelara every 6 weeks I think just due to the whole clinical picture  In 2021, developed severe papilledema and  vision abnormalities.  And headaches varied she was treated with Diamox  We proceeded with colonoscopy this year 7/2022 which was normal other than she had a cecal abnormality which was deformed and unable to enter into the ileum.  She had a cecal lesion which has been there for years that just came back as inflammation.  Colonic biopsies were normal but cecal biopsy showed inflammation.  CT 3 showed improved inflammation in the terminal ileum although there was still some inflammation.   We did ask her to check Remicade level antibodies to see if she had that as she is interested in going back to that At last visit, given active ileal dz despite being on q 6 weeks of stelara for over one year, we decided we were going to switch her to either remicaide or skyrizi, we were awaiting to see if she has remicaide antibodies.  There was no antibodies.    She is here for follow-up today.  She had been put on a trial of steroids in July.  This was before she had that CT scan.  She is having really intermittent right lower quadrant pain not related to eating or bowel movement.  She has about 2 bowel movements a day that are loose that can fluctuate with constipation.  When her symptoms were very bad she had more constipation.  Weight is stable.  She says when she tried Remicade she had chest pain.  At last visit we received a do a CT enterography and fecal calprotectin and started paperwork for Skyrizi Do not see fecal calprotectin in.  CT [place] fee in July showed decrease inflammation in the ileum but there is still mild inflammation and we switched her to Skyrizi which did take a long time due to authorization issues  labs in August were normal, she is not immune to hepatitis B  She just started her Skyrizi and had 2 doses her last one was February 8.  She has not really noticed a change.  Then couple days ago she had acute onset of right lower quadrant pain which is normally where she gets her pain but this was more significant.  She went to the ER and they did basic labs which showed normal hemoglobin but an elevated white count of 16.5 normal LFTs.  For some reason they did an ultrasound instead of a CT scan which showed a 2 mm gallbladder polyp.  They did not image her.  She is also having nausea and fever and.  And also having some numbness in her hands.  This is really resolved and she is back to having just intermittent right lower quadrant abdominal pain.  Maybe a little bit more frequently than other times.  They also sent her home with dicyclomine.  She is having 1 bowel movement a day which is somewhat loose.  No hematochezia she has lost a few pounds and she is having a lot of reflux which is a newer symptom.  She still having a little bit of nausea and has not really tried to advance her diet since being out of the ER At last visit, we ordered a CTE to see if her symptoms were due to her Crohns or a viral infection.  She comes in for follow-up today.  She is doing about the same.  Still getting intermittent and right lower quadrant pain now she cannot feels a hardness in the midepigastrium.  She has been on Protonix a week for her reflux she was getting no real change there.  We got a CT enterography which showed improved inflammation in the ileum and she gets her third Skyrizi tomorrow Plan at last visit was to check labs and fecal calpro in 3 months

## 2023-06-08 ENCOUNTER — TELEPHONE ENCOUNTER (OUTPATIENT)
Dept: URBAN - METROPOLITAN AREA CLINIC 9 | Facility: CLINIC | Age: 23
End: 2023-06-08

## 2023-06-16 LAB
% SATURATION: 14
ABSOLUTE BASOPHILS: 40
ABSOLUTE EOSINOPHILS: 268
ABSOLUTE LYMPHOCYTES: 1910
ABSOLUTE MONOCYTES: 529
ABSOLUTE NEUTROPHILS: 3953
ALBUMIN/GLOBULIN RATIO: 1.7
ALBUMIN: 4.5
ALKALINE PHOSPHATASE: 58
ALT: 10
AST: 12
BASOPHILS: 0.6
BILIRUBIN, TOTAL: 0.2
BUN/CREATININE RATIO: (no result)
CALCIUM: 9.3
CARBON DIOXIDE: 25
CHLORIDE: 105
CREATININE: 0.82
EOSINOPHILS: 4
FERRITIN: 15
GLOBULIN: 2.7
GLUCOSE: 81
HEMATOCRIT: 41.7
HEMOGLOBIN: 13.8
IRON BINDING CAPACITY: 318
IRON, TOTAL: 46
LYMPHOCYTES: 28.5
MCH: 29.2
MCHC: 33.1
MCV: 88.2
MONOCYTES: 7.9
MPV: 10.9
NEUTROPHILS: 59
PLATELET COUNT: 282
POTASSIUM: 4.4
PROTEIN, TOTAL: 7.2
RDW: 13.4
RED BLOOD CELL COUNT: 4.73
SODIUM: 140
UREA NITROGEN (BUN): 7
WHITE BLOOD CELL COUNT: 6.7

## 2023-06-23 LAB — CALPROTECTIN, FECAL: 58

## 2023-07-19 ENCOUNTER — LAB OUTSIDE AN ENCOUNTER (OUTPATIENT)
Dept: URBAN - METROPOLITAN AREA CLINIC 9 | Facility: CLINIC | Age: 23
End: 2023-07-19

## 2023-07-19 ENCOUNTER — OFFICE VISIT (OUTPATIENT)
Dept: URBAN - METROPOLITAN AREA CLINIC 9 | Facility: CLINIC | Age: 23
End: 2023-07-19
Payer: COMMERCIAL

## 2023-07-19 ENCOUNTER — TELEPHONE ENCOUNTER (OUTPATIENT)
Dept: URBAN - METROPOLITAN AREA CLINIC 7 | Facility: CLINIC | Age: 23
End: 2023-07-19

## 2023-07-19 VITALS
BODY MASS INDEX: 29.23 KG/M2 | SYSTOLIC BLOOD PRESSURE: 109 MMHG | WEIGHT: 165 LBS | DIASTOLIC BLOOD PRESSURE: 72 MMHG | HEIGHT: 63 IN

## 2023-07-19 DIAGNOSIS — K58.0 IRRITABLE BOWEL SYNDROME WITH DIARRHEA: ICD-10-CM

## 2023-07-19 DIAGNOSIS — R63.4 WEIGHT LOSS: ICD-10-CM

## 2023-07-19 DIAGNOSIS — E61.1 IRON DEFICIENCY: ICD-10-CM

## 2023-07-19 DIAGNOSIS — K82.4 GALLBLADDER POLYP: ICD-10-CM

## 2023-07-19 DIAGNOSIS — K21.9 GERD: ICD-10-CM

## 2023-07-19 DIAGNOSIS — R68.81 EARLY SATIETY: ICD-10-CM

## 2023-07-19 DIAGNOSIS — K50.90 CROHN'S DISEASE WITHOUT COMPLICATION, UNSPECIFIED GASTROINTESTINAL TRACT LOCATION: ICD-10-CM

## 2023-07-19 PROBLEM — 197125005: Status: ACTIVE | Noted: 2023-07-19

## 2023-07-19 PROCEDURE — 99214 OFFICE O/P EST MOD 30 MIN: CPT | Performed by: INTERNAL MEDICINE

## 2023-07-19 RX ORDER — DICYCLOMINE HYDROCHLORIDE 10 MG/1
1 CAPSULE 30 MINUTES BEFORE EATING CAPSULE ORAL THREE TIMES A DAY
Qty: 90 | Refills: 0 | OUTPATIENT
Start: 2023-07-19 | End: 2023-08-18

## 2023-07-19 RX ORDER — FAMOTIDINE 40 MG/1
1 TABLET AT BEDTIME TABLET, FILM COATED ORAL TWICE A DAY
Qty: 180 TABLET | Refills: 0 | OUTPATIENT
Start: 2023-07-19

## 2023-07-19 RX ORDER — RISANKIZUMAB-RZAA 360 MG/2.4
AS DIRECTED WEARABLE INJECTOR SUBCUTANEOUS
Qty: 1 | Refills: 4 | Status: ACTIVE | COMMUNITY
Start: 2023-04-06 | End: 2024-01-10

## 2023-07-19 RX ORDER — ONDANSETRON 8 MG/1
1 TABLET ON THE TONGUE AND ALLOW TO DISSOLVE  AS NEEDED TABLET, ORALLY DISINTEGRATING ORAL ONCE A DAY
Qty: 90 | Refills: 0 | Status: DISCONTINUED | COMMUNITY
Start: 2023-02-28

## 2023-07-19 RX ORDER — PANTOPRAZOLE SODIUM 40 MG/1
1 TABLET TABLET, DELAYED RELEASE ORAL ONCE A DAY
Qty: 30 | Refills: 2 | Status: DISCONTINUED | COMMUNITY
Start: 2023-05-02

## 2023-07-19 RX ORDER — RISANKIZUMAB-RZAA 60 MG/ML
AS DIRECTED INJECTION INTRAVENOUS
Qty: 1 APPLICATOR | Refills: 0 | Status: ACTIVE | COMMUNITY

## 2023-07-19 RX ORDER — PANTOPRAZOLE SODIUM 40 MG/1
1 TABLET TABLET, DELAYED RELEASE ORAL ONCE A DAY
Qty: 30 | Status: ACTIVE | COMMUNITY
Start: 2023-02-28

## 2023-07-19 NOTE — HPI-TODAY'S VISIT:
21-year-old female here for follow-up.  We last saw her about a month ago. At prior visit: 21-year-old female here for hospital follow-up.  Patient has a history of Crohn's disease.  She said she was diagnosed in 2012 when her main symptom was the perirectal fistula constipation and abdominal pain.  It sounds like she had a fistula repair and then but was put on Remicade.  She had 4-5 doses and had some kind of reaction so was switch to Humira.  This initially worked but then she got antibodies and then stopped working so she was on Entyvio.  This worked for a little bit and then stopped working so she was put on Stelara about 2-3 years ago.  At some point she was switched to every 6 weeks I do not know when.  She said in May she started having reoccurrence of right lower quadrant pain.  It was pretty bad so she went to the ER recent labs show white blood cell count of 10 and a hemoglobin of 14.3.  Should a CT scan that showed acute uncomplicated appendicitis.  She had wall thickening of the cecum and the ascending colon which was likely reactive.  She also had a dilated appendix.  ESR was 72.  She was at home on prednisone 40 mg and Bentyl.  She said the pain is gotten better now that she is on the prednisone.  She is about 2-3 bowel movements a day.  There may be slightly looser than normal.  We have a fecal Hang protectant on June 30 is which was 985.  This was pre-prednisone.  Also Stelara lab was 4.5 at the trough. At last visit we obtain records.: We have records which show on November 14 fecal Hang protectant was  8 we also have an MR enterography on 2014 which showed irregular wall thickening and enhancement in the terminal ileum in IC valve.  This is similar to the previous visit.  And loss of normal mucosal folds in the distal ileum.  There is also wall thickening in the proximal jejunum.  In 2017 I have another MRE which showed internal worsening mucosal enhancement of the terminal ileum and cecum in 2018 again worsening inflammatory changes in the ascending colon and the cecum and the terminal ileum  She has a history of ileocolonic Crohn's.  She was diagnosed at age 12.  Initially  had only small bowel involvement at that time.  She was immediately started on Remicade had a reaction after the third 1, and switched to Humira.  Remained on Humira for quite some time apparently she had some psoriasis but this was treated topically but then developed antibodies so  she was  switched to Entyvio which she been on for 2 years initially was every 8 weeks but she had persistent symptoms so switch to every 4 weeks knows every 6 weeks.  She was having intermittent right lower quadrant pain on this.  Her last endoscopy of 2018 showed colitis in the cecum with pseudopolyps and distortion of the cecal anatomy and inability to identify the ileocecal valve were in a paper the ileum.  The ascending pathology showed chronic colitis the remaining biopsies in the left side of colon and rectum were normal.  In  2020 she had another colonoscopy which again revealed inflammation the cecum inflamed ileum that was difficult to intubate so she was started on Entyvio March 26 of 2020.  Ileum showed active chronic ileitis with ulceration she also the cecal abnormality with polypoid large intestinal mucosa but that just came back as reactive with chronic inflammation After being on Stelara for about 4 weeks she went to the ER and she had a bowel obstruction she was started on tacrolimus and continued Stelara  Given her small bowel obstruction on Stelara given she only been on 4 weeks she was put on high-dose steroids and was decided to reinduce her was Stelara and tacrolimus was used as bridging therapy.  She was then put on Stelara every 6 weeks I think just due to the whole clinical picture  In 2021, developed severe papilledema and  vision abnormalities.  And headaches varied she was treated with Diamox  We proceeded with colonoscopy this year 7/2022 which was normal other than she had a cecal abnormality which was deformed and unable to enter into the ileum.  She had a cecal lesion which has been there for years that just came back as inflammation.  Colonic biopsies were normal but cecal biopsy showed inflammation.  CT 3 showed improved inflammation in the terminal ileum although there was still some inflammation.   We did ask her to check Remicade level antibodies to see if she had that as she is interested in going back to that At last visit, given active ileal dz despite being on q 6 weeks of stelara for over one year, we decided we were going to switch her to either remicaide or skyrizi, we were awaiting to see if she has remicaide antibodies.  There was no antibodies.    She is here for follow-up today.  She had been put on a trial of steroids in July.  This was before she had that CT scan.  She is having really intermittent right lower quadrant pain not related to eating or bowel movement.  She has about 2 bowel movements a day that are loose that can fluctuate with constipation.  When her symptoms were very bad she had more constipation.  Weight is stable.  She says when she tried Remicade she had chest pain.  At last visit we received a do a CT enterography and fecal calprotectin and started paperwork for Skyrizi Do not see fecal calprotectin in.  CT [place] fee in July showed decrease inflammation in the ileum but there is still mild inflammation and we switched her to Skyrizi which did take a long time due to authorization issues  labs in August were normal, she is not immune to hepatitis B  She just started her Skyrizi and had 2 doses her last one was February 8.  She has not really noticed a change.  Then couple days ago she had acute onset of right lower quadrant pain which is normally where she gets her pain but this was more significant.  She went to the ER and they did basic labs which showed normal hemoglobin but an elevated white count of 16.5 normal LFTs.  For some reason they did an ultrasound instead of a CT scan which showed a 2 mm gallbladder polyp.  They did not image her.  She is also having nausea and fever and.  And also having some numbness in her hands.  This is really resolved and she is back to having just intermittent right lower quadrant abdominal pain.  Maybe a little bit more frequently than other times.  They also sent her home with dicyclomine.  She is having 1 bowel movement a day which is somewhat loose.  No hematochezia she has lost a few pounds and she is having a lot of reflux which is a newer symptom.  She still having a little bit of nausea and has not really tried to advance her diet since being out of the ER At last visit, we ordered a CTE to see if her symptoms were due to her Crohns or a viral infection.  She comes in for follow-up today.  She is doing about the same.  Still getting intermittent and right lower quadrant pain now she cannot feels a hardness in the midepigastrium.  She has been on Protonix a week for her reflux she was getting no real change there.  We got a CT enterography which showed improved inflammation in the ileum and she gets her third Skyrizi tomorrow Plan at last visit was to check labs and fecal calpro in 3 months At last visit plan was to repeat fecal calprotectin and basic labs in 3 months.  We did put her on a Protonix course for her reflux. Labs in June showed normal CBC but her iron was low with the iron sat of 15.  Her fecal calprotectin was in the 50s.  We had a fecal calprotectin in June 30 2022 that was 985  She is here for follow-up today.  She has been on Skyrizi since February.  She had a normalization of her fecal calprotectin and CT enterography in March was greatly improved.  She had really no change in her symptoms which is like a constant right lower quadrant pain.  She has had worsening of reflux so despite being on Protonix and now she is having early satiety and she is lost about 30 pounds.  She does have an element of depression.  She does have heavy periods and has not seen OB.

## 2023-07-24 ENCOUNTER — OUT OF OFFICE VISIT (OUTPATIENT)
Dept: URBAN - METROPOLITAN AREA SURGERY CENTER 9 | Facility: SURGERY CENTER | Age: 23
End: 2023-07-24
Payer: COMMERCIAL

## 2023-07-24 ENCOUNTER — CLAIMS CREATED FROM THE CLAIM WINDOW (OUTPATIENT)
Dept: URBAN - METROPOLITAN AREA CLINIC 4 | Facility: CLINIC | Age: 23
End: 2023-07-24
Payer: COMMERCIAL

## 2023-07-24 DIAGNOSIS — R12 HEARTBURN: ICD-10-CM

## 2023-07-24 DIAGNOSIS — K29.70 GASTRITIS WITHOUT BLEEDING, UNSPECIFIED CHRONICITY, UNSPECIFIED GASTRITIS TYPE: ICD-10-CM

## 2023-07-24 DIAGNOSIS — K29.60 OTHER GASTRITIS WITHOUT BLEEDING: ICD-10-CM

## 2023-07-24 DIAGNOSIS — K31.89 OTHER DISEASES OF STOMACH AND DUODENUM: ICD-10-CM

## 2023-07-24 PROBLEM — 4556007: Status: ACTIVE | Noted: 2023-07-24

## 2023-07-24 PROCEDURE — 43239 EGD BIOPSY SINGLE/MULTIPLE: CPT | Performed by: INTERNAL MEDICINE

## 2023-07-24 PROCEDURE — 00731 ANES UPR GI NDSC PX NOS: CPT | Performed by: NURSE ANESTHETIST, CERTIFIED REGISTERED

## 2023-07-24 PROCEDURE — 88305 TISSUE EXAM BY PATHOLOGIST: CPT | Performed by: PATHOLOGY

## 2023-07-24 PROCEDURE — 88342 IMHCHEM/IMCYTCHM 1ST ANTB: CPT | Performed by: PATHOLOGY

## 2023-07-24 RX ORDER — PANTOPRAZOLE SODIUM 40 MG/1
1 TABLET TABLET, DELAYED RELEASE ORAL ONCE A DAY
Qty: 30 | Status: ACTIVE | COMMUNITY
Start: 2023-02-28

## 2023-07-24 RX ORDER — RISANKIZUMAB-RZAA 360 MG/2.4
AS DIRECTED WEARABLE INJECTOR SUBCUTANEOUS
Qty: 1 | Refills: 4 | Status: ACTIVE | COMMUNITY
Start: 2023-04-06 | End: 2024-01-10

## 2023-07-24 RX ORDER — FAMOTIDINE 40 MG/1
1 TABLET AT BEDTIME TABLET, FILM COATED ORAL TWICE A DAY
Qty: 180 TABLET | Refills: 0 | Status: ACTIVE | COMMUNITY
Start: 2023-07-19

## 2023-07-24 RX ORDER — DICYCLOMINE HYDROCHLORIDE 10 MG/1
1 CAPSULE 30 MINUTES BEFORE EATING CAPSULE ORAL THREE TIMES A DAY
Qty: 90 | Refills: 0 | Status: ACTIVE | COMMUNITY
Start: 2023-07-19 | End: 2023-08-18

## 2023-07-24 RX ORDER — RISANKIZUMAB-RZAA 60 MG/ML
AS DIRECTED INJECTION INTRAVENOUS
Qty: 1 APPLICATOR | Refills: 0 | Status: ACTIVE | COMMUNITY

## 2023-08-03 ENCOUNTER — TELEPHONE ENCOUNTER (OUTPATIENT)
Dept: URBAN - METROPOLITAN AREA CLINIC 9 | Facility: CLINIC | Age: 23
End: 2023-08-03

## 2023-08-07 PROBLEM — 4556007: Status: ACTIVE | Noted: 2023-08-07

## 2023-08-08 ENCOUNTER — TELEPHONE ENCOUNTER (OUTPATIENT)
Dept: URBAN - METROPOLITAN AREA CLINIC 9 | Facility: CLINIC | Age: 23
End: 2023-08-08

## 2023-08-10 ENCOUNTER — TELEPHONE ENCOUNTER (OUTPATIENT)
Dept: URBAN - METROPOLITAN AREA CLINIC 9 | Facility: CLINIC | Age: 23
End: 2023-08-10

## 2023-08-10 RX ORDER — ONDANSETRON 8 MG/1
1 TABLET ON THE TONGUE AND ALLOW TO DISSOLVE AS NEEDED TABLET, ORALLY DISINTEGRATING ORAL
Qty: 90 | Refills: 3 | OUTPATIENT
Start: 2023-08-10

## 2023-08-15 ENCOUNTER — TELEPHONE ENCOUNTER (OUTPATIENT)
Dept: URBAN - METROPOLITAN AREA CLINIC 9 | Facility: CLINIC | Age: 23
End: 2023-08-15

## 2023-08-17 ENCOUNTER — TELEPHONE ENCOUNTER (OUTPATIENT)
Dept: URBAN - METROPOLITAN AREA CLINIC 9 | Facility: CLINIC | Age: 23
End: 2023-08-17

## 2023-08-21 ENCOUNTER — TELEPHONE ENCOUNTER (OUTPATIENT)
Dept: URBAN - METROPOLITAN AREA CLINIC 9 | Facility: CLINIC | Age: 23
End: 2023-08-21

## 2023-09-08 LAB — CALPROTECTIN, FECAL: 83

## 2023-09-13 ENCOUNTER — TELEPHONE ENCOUNTER (OUTPATIENT)
Dept: URBAN - METROPOLITAN AREA CLINIC 9 | Facility: CLINIC | Age: 23
End: 2023-09-13

## 2023-09-13 RX ORDER — PANTOPRAZOLE SODIUM 40 MG/1
1 TABLET TABLET, DELAYED RELEASE ORAL ONCE A DAY
Qty: 30 | Refills: 2
Start: 2023-02-28

## 2023-10-18 ENCOUNTER — TELEPHONE ENCOUNTER (OUTPATIENT)
Dept: URBAN - METROPOLITAN AREA CLINIC 9 | Facility: CLINIC | Age: 23
End: 2023-10-18

## 2023-10-18 RX ORDER — SODIUM, POTASSIUM,MAG SULFATES 17.5-3.13G
177ML SOLUTION, RECONSTITUTED, ORAL ORAL
Qty: 1 | Refills: 0 | OUTPATIENT
Start: 2023-10-19 | End: 2023-10-21

## 2023-10-30 ENCOUNTER — TELEPHONE ENCOUNTER (OUTPATIENT)
Dept: URBAN - METROPOLITAN AREA CLINIC 9 | Facility: CLINIC | Age: 23
End: 2023-10-30

## 2023-11-28 ENCOUNTER — OFFICE VISIT (OUTPATIENT)
Dept: URBAN - METROPOLITAN AREA SURGERY CENTER 9 | Facility: SURGERY CENTER | Age: 23
End: 2023-11-28
Payer: COMMERCIAL

## 2023-11-28 ENCOUNTER — CLAIMS CREATED FROM THE CLAIM WINDOW (OUTPATIENT)
Dept: URBAN - METROPOLITAN AREA CLINIC 4 | Facility: CLINIC | Age: 23
End: 2023-11-28
Payer: COMMERCIAL

## 2023-11-28 DIAGNOSIS — K63.89 OTHER SPECIFIED DISEASES OF INTESTINE: ICD-10-CM

## 2023-11-28 DIAGNOSIS — K50.00 CROHN'S DISEASE OF SMALL INTESTINE WITHOUT COMPLICATION: ICD-10-CM

## 2023-11-28 DIAGNOSIS — K63.2 COLONIC FISTULA: ICD-10-CM

## 2023-11-28 DIAGNOSIS — K64.8 OTHER HEMORRHOIDS: ICD-10-CM

## 2023-11-28 DIAGNOSIS — K63.2 FISTULA OF INTESTINE: ICD-10-CM

## 2023-11-28 DIAGNOSIS — K50.00 CROHN'S DISEASE OF SMALL INTESTINE: ICD-10-CM

## 2023-11-28 PROCEDURE — 45380 COLONOSCOPY AND BIOPSY: CPT | Performed by: INTERNAL MEDICINE

## 2023-11-28 PROCEDURE — 88305 TISSUE EXAM BY PATHOLOGIST: CPT | Performed by: PATHOLOGY

## 2023-11-28 PROCEDURE — 00811 ANES LWR INTST NDSC NOS: CPT | Performed by: NURSE ANESTHETIST, CERTIFIED REGISTERED

## 2023-11-28 RX ORDER — RISANKIZUMAB-RZAA 360 MG/2.4
AS DIRECTED WEARABLE INJECTOR SUBCUTANEOUS
Qty: 1 | Refills: 4 | Status: ACTIVE | COMMUNITY
Start: 2023-04-06 | End: 2024-01-10

## 2023-11-28 RX ORDER — ONDANSETRON 8 MG/1
1 TABLET ON THE TONGUE AND ALLOW TO DISSOLVE AS NEEDED TABLET, ORALLY DISINTEGRATING ORAL
Qty: 90 | Refills: 3 | Status: ACTIVE | COMMUNITY
Start: 2023-08-10

## 2023-11-28 RX ORDER — PANTOPRAZOLE SODIUM 40 MG/1
1 TABLET TABLET, DELAYED RELEASE ORAL ONCE A DAY
Qty: 30 | Refills: 2 | Status: ACTIVE | COMMUNITY
Start: 2023-02-28

## 2023-11-28 RX ORDER — FAMOTIDINE 40 MG/1
1 TABLET AT BEDTIME TABLET, FILM COATED ORAL TWICE A DAY
Qty: 180 TABLET | Refills: 0 | Status: ACTIVE | COMMUNITY
Start: 2023-07-19

## 2023-11-28 RX ORDER — RISANKIZUMAB-RZAA 60 MG/ML
AS DIRECTED INJECTION INTRAVENOUS
Qty: 1 APPLICATOR | Refills: 0 | Status: ACTIVE | COMMUNITY

## 2023-11-30 ENCOUNTER — OFFICE VISIT (OUTPATIENT)
Dept: URBAN - METROPOLITAN AREA CLINIC 9 | Facility: CLINIC | Age: 23
End: 2023-11-30
Payer: COMMERCIAL

## 2023-11-30 ENCOUNTER — DASHBOARD ENCOUNTERS (OUTPATIENT)
Age: 23
End: 2023-11-30

## 2023-11-30 VITALS
BODY MASS INDEX: 28.53 KG/M2 | WEIGHT: 161 LBS | HEIGHT: 63 IN | SYSTOLIC BLOOD PRESSURE: 110 MMHG | DIASTOLIC BLOOD PRESSURE: 78 MMHG

## 2023-11-30 DIAGNOSIS — K50.90 CROHN'S DISEASE WITHOUT COMPLICATION, UNSPECIFIED GASTROINTESTINAL TRACT LOCATION: ICD-10-CM

## 2023-11-30 DIAGNOSIS — K58.0 IRRITABLE BOWEL SYNDROME WITH DIARRHEA: ICD-10-CM

## 2023-11-30 DIAGNOSIS — K82.4 GALLBLADDER POLYP: ICD-10-CM

## 2023-11-30 DIAGNOSIS — K21.9 GERD: ICD-10-CM

## 2023-11-30 PROCEDURE — 99214 OFFICE O/P EST MOD 30 MIN: CPT | Performed by: INTERNAL MEDICINE

## 2023-11-30 RX ORDER — FAMOTIDINE 40 MG/1
1 TABLET AT BEDTIME TABLET, FILM COATED ORAL TWICE A DAY
Qty: 180 TABLET | Refills: 0 | Status: ACTIVE | COMMUNITY
Start: 2023-07-19

## 2023-11-30 RX ORDER — RISANKIZUMAB-RZAA 60 MG/ML
AS DIRECTED INJECTION INTRAVENOUS
Qty: 1 APPLICATOR | Refills: 0 | Status: ACTIVE | COMMUNITY

## 2023-11-30 RX ORDER — PANTOPRAZOLE SODIUM 40 MG/1
1 TABLET TABLET, DELAYED RELEASE ORAL ONCE A DAY
Qty: 30 | Refills: 2 | Status: ACTIVE | COMMUNITY
Start: 2023-02-28

## 2023-11-30 RX ORDER — RISANKIZUMAB-RZAA 360 MG/2.4
AS DIRECTED WEARABLE INJECTOR SUBCUTANEOUS
Qty: 1 | Refills: 4 | Status: ACTIVE | COMMUNITY
Start: 2023-04-06 | End: 2024-01-10

## 2023-11-30 RX ORDER — ONDANSETRON 8 MG/1
1 TABLET ON THE TONGUE AND ALLOW TO DISSOLVE AS NEEDED TABLET, ORALLY DISINTEGRATING ORAL
Qty: 90 | Refills: 3 | Status: ACTIVE | COMMUNITY
Start: 2023-08-10

## 2023-11-30 NOTE — HPI-TODAY'S VISIT:
21-year-old female here for follow-up.  We last saw her about a month ago. At prior visit: 21-year-old female here for hospital follow-up.  Patient has a history of Crohn's disease.  She said she was diagnosed in 2012 when her main symptom was the perirectal fistula constipation and abdominal pain.  It sounds like she had a fistula repair and then but was put on Remicade.  She had 4-5 doses and had some kind of reaction so was switch to Humira.  This initially worked but then she got antibodies and then stopped working so she was on Entyvio.  This worked for a little bit and then stopped working so she was put on Stelara about 2-3 years ago.  At some point she was switched to every 6 weeks I do not know when.  She said in May she started having reoccurrence of right lower quadrant pain.  It was pretty bad so she went to the ER recent labs show white blood cell count of 10 and a hemoglobin of 14.3.  Should a CT scan that showed acute uncomplicated appendicitis.  She had wall thickening of the cecum and the ascending colon which was likely reactive.  She also had a dilated appendix.  ESR was 72.  She was at home on prednisone 40 mg and Bentyl.  She said the pain is gotten better now that she is on the prednisone.  She is about 2-3 bowel movements a day.  There may be slightly looser than normal.  We have a fecal Hang protectant on June 30 is which was 985.  This was pre-prednisone.  Also Stelara lab was 4.5 at the trough. At last visit we obtain records.: We have records which show on November 14 fecal Hang protectant was  8 we also have an MR enterography on 2014 which showed irregular wall thickening and enhancement in the terminal ileum in IC valve.  This is similar to the previous visit.  And loss of normal mucosal folds in the distal ileum.  There is also wall thickening in the proximal jejunum.  In 2017 I have another MRE which showed internal worsening mucosal enhancement of the terminal ileum and cecum in 2018 again worsening inflammatory changes in the ascending colon and the cecum and the terminal ileum  She has a history of ileocolonic Crohn's.  She was diagnosed at age 12.  Initially  had only small bowel involvement at that time.  She was immediately started on Remicade had a reaction after the third 1, and switched to Humira.  Remained on Humira for quite some time apparently she had some psoriasis but this was treated topically but then developed antibodies so  she was  switched to Entyvio which she been on for 2 years initially was every 8 weeks but she had persistent symptoms so switch to every 4 weeks knows every 6 weeks.  She was having intermittent right lower quadrant pain on this.  Her last endoscopy of 2018 showed colitis in the cecum with pseudopolyps and distortion of the cecal anatomy and inability to identify the ileocecal valve were in a paper the ileum.  The ascending pathology showed chronic colitis the remaining biopsies in the left side of colon and rectum were normal.  In  2020 she had another colonoscopy which again revealed inflammation the cecum inflamed ileum that was difficult to intubate so she was started on Entyvio March 26 of 2020.  Ileum showed active chronic ileitis with ulceration she also the cecal abnormality with polypoid large intestinal mucosa but that just came back as reactive with chronic inflammation After being on Stelara for about 4 weeks she went to the ER and she had a bowel obstruction she was started on tacrolimus and continued Stelara  Given her small bowel obstruction on Stelara given she only been on 4 weeks she was put on high-dose steroids and was decided to reinduce her was Stelara and tacrolimus was used as bridging therapy.  She was then put on Stelara every 6 weeks I think just due to the whole clinical picture  In 2021, developed severe papilledema and  vision abnormalities.  And headaches varied she was treated with Diamox  We proceeded with colonoscopy this year 7/2022 which was normal other than she had a cecal abnormality which was deformed and unable to enter into the ileum.  She had a cecal lesion which has been there for years that just came back as inflammation.  Colonic biopsies were normal but cecal biopsy showed inflammation.  CT 3 showed improved inflammation in the terminal ileum although there was still some inflammation.   We did ask her to check Remicade level antibodies to see if she had that as she is interested in going back to that At last visit, given active ileal dz despite being on q 6 weeks of stelara for over one year, we decided we were going to switch her to either remicaide or skyrizi, we were awaiting to see if she has remicaide antibodies.  There was no antibodies.    She is here for follow-up today.  She had been put on a trial of steroids in July.  This was before she had that CT scan.  She is having really intermittent right lower quadrant pain not related to eating or bowel movement.  She has about 2 bowel movements a day that are loose that can fluctuate with constipation.  When her symptoms were very bad she had more constipation.  Weight is stable.  She says when she tried Remicade she had chest pain.  At last visit we received a do a CT enterography and fecal calprotectin and started paperwork for Skyrizi Do not see fecal calprotectin in.  CT Tar[facility]y fee in July showed decrease inflammation in the ileum but there is still mild inflammation and we switched her to Skyrizi which did take a long time due to authorization issues  labs in August were normal, she is not immune to hepatitis B  She just started her Skyrizi and had 2 doses her last one was February 8.  She has not really noticed a change.  Then couple days ago she had acute onset of right lower quadrant pain which is normally where she gets her pain but this was more significant.  She went to the ER and they did basic labs which showed normal hemoglobin but an elevated white count of 16.5 normal LFTs.  For some reason they did an ultrasound instead of a CT scan which showed a 2 mm gallbladder polyp.  They did not image her.  She is also having nausea and fever and.  And also having some numbness in her hands.  This is really resolved and she is back to having just intermittent right lower quadrant abdominal pain.  Maybe a little bit more frequently than other times.  They also sent her home with dicyclomine.  She is having 1 bowel movement a day which is somewhat loose.  No hematochezia she has lost a few pounds and she is having a lot of reflux which is a newer symptom.  She still having a little bit of nausea and has not really tried to advance her diet since being out of the ER At last visit, we ordered a CTE to see if her symptoms were due to her Crohns or a viral infection.  She comes in for follow-up today.  She is doing about the same.  Still getting intermittent and right lower quadrant pain now she cannot feels a hardness in the midepigastrium.  She has been on Protonix a week for her reflux she was getting no real change there.  We got a CT enterography which showed improved inflammation in the ileum and she gets her third Skyrizi tomorrow Plan at last visit was to check labs and fecal calpro in 3 months At last visit plan was to repeat fecal calprotectin and basic labs in 3 months.  We did put her on a Protonix course for her reflux. Labs in June showed normal CBC but her iron was low with the iron sat of 15.  Her fecal calprotectin was in the 50s.  We had a fecal calprotectin in June 30 2022 that was 985  She is here for follow-up today.  She has been on Skyrizi since February.  She had a normalization of her fecal calprotectin and CT enterography in March was greatly improved.  She had really no change in her symptoms which is like a constant right lower quadrant pain.  She has had worsening of reflux so despite being on Protonix and now she is having early satiety and she is lost about 30 pounds.  She does have an element of depression.  She does have heavy periods and has not seen OB. At last visit we did MRE 10/2023 which showed ileitis bith chronic and active which not much change from 3/2023. They did comment on possibility of small intestine to small intestine anastomosis. Patient has nver had surgery (and this was not seen on CTE in 3/2023) I proceeded with colonoscopy which showed fairly normal terminal ileum (I was able this time to get into TI) but had a fistula opening both at 7 cm of T( and in cecum) both couldnt be trasnvered with scope. Last labs 2/2023 were normal.Normal fecal calpro 9/2023. I did send her to see Dr. Wexner at [facility] last time to discuss resection At last visit we also treated her for possible ibs overlap and put her on rifaximin and bentyl prn She is here for follow-up today.  We went over all her findings.  She still is having just constant right lower quadrant pain made worse by eating.  She will make up a follow-up appoint with Dr. Mir to discuss possible resection

## 2023-12-12 ENCOUNTER — TELEPHONE ENCOUNTER (OUTPATIENT)
Dept: URBAN - METROPOLITAN AREA CLINIC 9 | Facility: CLINIC | Age: 23
End: 2023-12-12